# Patient Record
Sex: FEMALE | Race: WHITE | URBAN - METROPOLITAN AREA
[De-identification: names, ages, dates, MRNs, and addresses within clinical notes are randomized per-mention and may not be internally consistent; named-entity substitution may affect disease eponyms.]

---
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
## 2023-01-11 ENCOUNTER — VIRTUAL VISIT (OUTPATIENT)
Dept: BEHAVIORAL/MENTAL HEALTH CLINIC | Age: 18
End: 2023-01-11
Payer: COMMERCIAL

## 2023-01-11 DIAGNOSIS — F41.1 GAD (GENERALIZED ANXIETY DISORDER): ICD-10-CM

## 2023-01-11 DIAGNOSIS — F60.3 BORDERLINE PERSONALITY DISORDER (HCC): ICD-10-CM

## 2023-01-11 DIAGNOSIS — F33.1 MODERATE EPISODE OF RECURRENT MAJOR DEPRESSIVE DISORDER (HCC): Primary | ICD-10-CM

## 2023-01-11 DIAGNOSIS — F90.2 ADHD (ATTENTION DEFICIT HYPERACTIVITY DISORDER), COMBINED TYPE: ICD-10-CM

## 2023-01-11 PROCEDURE — 99214 OFFICE O/P EST MOD 30 MIN: CPT | Performed by: NURSE PRACTITIONER

## 2023-01-11 NOTE — PROGRESS NOTES
CHIEF COMPLAINT:  Mariajose Khoury is a 21 y.o. female and was seen today for follow-up of psychiatric condition and psychotropic medication management. HPI:    Frankie [facility] reports the following psychiatric symptoms by hx:  depression, anxiety and impaired concentration . Overall symptoms have been present for years. Currently symptom are of moderate severity. The symptoms occur intermittently. Pt reports medications are beneficial, symptoms are improved. Met with pt via telehealth for appt today to review current treatment plan. FAMILY/SOCIAL HX:   Psychosocial Stressors   Had a recent break up      REVIEW OF SYSTEMS:  Psychiatric symptoms being monitored for:  depression, ADHD, anxiety  Appetite:good   Sleep: improved   Neuro: denies  Cardio: denies   Patient positive for COVID     There were no vitals taken for this visit. Side Effects:  none    MENTAL STATUS EXAM:   Sensorium  oriented to time, place and person   Relations cooperative   Appearance:  age appropriate and within normal Limits   Motor Behavior:  within normal limits   Speech:  normal pitch and normal volume   Thought Process: within normal limits   Thought Content free of delusions and free of hallucinations   Suicidal ideations none   Homicidal ideations none   Mood:  within normal limits   Affect:  normal   Memory recent  adequate   Memory remote:  adequate   Concentration:  adequate   Abstraction:  abstract   Insight:  good   Reliability good   Judgment:  good     MEDICAL DECISION MAKING:  Problems addressed today:    ICD-10-CM ICD-9-CM    1. Moderate episode of recurrent major depressive disorder (HCC)  F33.1 296.32       2. ADHD (attention deficit hyperactivity disorder), combined type  F90.2 314.01       3. Borderline personality disorder ([facility] [address]  F60.3 301.83       4. NIK (generalized anxiety disorder)  F41.1 300.02           Assessment:   Frankie Smith is responding to treatment. Symptoms are stable overall.  She has some slight exacerbations of symptoms at the [facility], but she is doing better. 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 Seeing therapist Celi Appley and is doing well with her. Reinforced positive coping strategies. Discussed current medications and dosages. No changes made today. Reviewed treatment goals and target symptoms to monitor for. Plan:   1. Current Outpatient Medications   Medication Sig Dispense Refill    desvenlafaxine succinate (PRISTIQ) 25 mg ER tablet Take 1 Tablet by mouth daily. 30 Tablet 1    ARIPiprazole (ABILIFY) 2 mg tablet Take 1 Tablet by mouth daily. 30 Tablet 2    amphetamine-dextroamphetamine XR (ADDERALL XR) 15 mg XR capsule Take 1 Capsule by mouth every morning. Max Daily Amount: 15 mg. 90 Capsule 0    propranoloL (INDERAL) 10 mg tablet Take 1 Tablet by mouth two (2) times daily as needed for Anxiety. 60 Tablet 0          medication changes made today: Continue Pristiq 25 mg po daily, Continue Abilify 2 mg po daily, continue Adderall 15 mg XR po daily. 2.  Counseling and coordination of care including instructions for treatment, risks/benefits, risk factor reduction and patient/family education. She agrees with the plan. Patient instructed to call with any side effects, questions or issues. Albert Barroso, was evaluated through a synchronous (real-time) audio-video encounter. The patient (or guardian if applicable) is aware that this is a billable service, which includes applicable co-pays. This Virtual Visit was conducted with patient's (and/or legal guardian's) consent. The visit was conducted pursuant to the emergency declaration under the [facility], [address] [place] [address] authority and the [employer] General Act. Patient identification was verified, and a caregiver was present when appropriate.   The patient was located at: Home: [facility] [address] [place] [address] [address]  The provider was located at: Facility (Appt Department): [id number] [facility]       An electronic signature was used to authenticate this note.   -- Fadia Flores NP          1/11/2023  Fadia Flores NP

## 2023-02-13 DIAGNOSIS — F90.2 ADHD (ATTENTION DEFICIT HYPERACTIVITY DISORDER), COMBINED TYPE: ICD-10-CM

## 2023-02-13 NOTE — TELEPHONE ENCOUNTER
Requested Prescriptions     Pending Prescriptions Disp Refills    amphetamine-dextroamphetamine XR (ADDERALL XR) 15 mg XR capsule 90 Capsule 0     Sig: Take 1 Capsule by mouth every morning. Max Daily Amount: 15 mg.       Last visit: 1/11/2023   Next visit: 4/11/2023

## 2023-02-16 RX ORDER — DEXTROAMPHETAMINE SACCHARATE, AMPHETAMINE ASPARTATE MONOHYDRATE, DEXTROAMPHETAMINE SULFATE AND AMPHETAMINE SULFATE 3.75; 3.75; 3.75; 3.75 MG/1; MG/1; MG/1; MG/1
15 CAPSULE, EXTENDED RELEASE ORAL
Qty: 90 CAPSULE | Refills: 0 | OUTPATIENT
Start: 2023-02-16

## 2023-03-01 DIAGNOSIS — F90.2 ADHD (ATTENTION DEFICIT HYPERACTIVITY DISORDER), COMBINED TYPE: ICD-10-CM

## 2023-03-01 NOTE — TELEPHONE ENCOUNTER
Requested Prescriptions     Pending Prescriptions Disp Refills    amphetamine-dextroamphetamine XR (ADDERALL XR) 15 mg XR capsule 90 Capsule 0     Sig: Take 1 Capsule by mouth every morning. Max Daily Amount: 15 mg. Last visit: 1/11/2023   Next visit: 4/11/2023     Patient confirmed pharmacy has medication in stock.

## 2023-03-02 RX ORDER — DEXTROAMPHETAMINE SACCHARATE, AMPHETAMINE ASPARTATE MONOHYDRATE, DEXTROAMPHETAMINE SULFATE AND AMPHETAMINE SULFATE 3.75; 3.75; 3.75; 3.75 MG/1; MG/1; MG/1; MG/1
15 CAPSULE, EXTENDED RELEASE ORAL
Qty: 90 CAPSULE | Refills: 0 | Status: SHIPPED | OUTPATIENT
Start: 2023-03-02

## 2023-03-02 NOTE — TELEPHONE ENCOUNTER
Last visit: 1/11/2023   Next visit: 4/11/2023      Patient confirmed pharmacy has medication in stock.     :  11/18/2022 10/18/2022 1 Adderall Xr 15 Mg Capsule 30.00 30 [place] [id number] [place] ([id number]) 0  [place]  09/23/2022 09/23/2022 1 Adderall Xr 15 Mg Capsule 30.00 30 [place]

## 2023-03-10 ENCOUNTER — TELEPHONE (OUTPATIENT)
Dept: BEHAVIORAL/MENTAL HEALTH CLINIC | Age: 18
End: 2023-03-10

## 2023-03-10 NOTE — TELEPHONE ENCOUNTER
Spoke with  regarding prior auth denial for Adderall XR. Patient has to try and fail two formulary alternatives. Alternatives have been scanned into [employer]; they include amphetamine-dextroamphetamine (which patient has tried), Mydayis, and Vyvanse are some of the choices.  Will send message to provider and patient regarding denial.

## 2023-03-13 DIAGNOSIS — F33.1 MODERATE EPISODE OF RECURRENT MAJOR DEPRESSIVE DISORDER (HCC): ICD-10-CM

## 2023-03-13 NOTE — TELEPHONE ENCOUNTER
Patient states she is home for spring break and left her medication at school. Patient is requesting a prescription for a 7 day supply of medication. Requested Prescriptions     Pending Prescriptions Disp Refills    ARIPiprazole (ABILIFY) 2 mg tablet 30 Tablet 2     Sig: Take 1 Tablet by mouth daily. desvenlafaxine succinate (PRISTIQ) 25 mg ER tablet 30 Tablet 1     Sig: Take 1 Tablet by mouth daily.       Last visit: 1/11/2023   Next visit: 4/11/2023

## 2023-03-14 RX ORDER — DESVENLAFAXINE 25 MG/1
25 TABLET, EXTENDED RELEASE ORAL DAILY
Qty: 7 TABLET | Refills: 0 | Status: SHIPPED | OUTPATIENT
Start: 2023-03-14

## 2023-03-14 RX ORDER — ARIPIPRAZOLE 2 MG/1
2 TABLET ORAL DAILY
Qty: 7 TABLET | Refills: 0 | Status: SHIPPED | OUTPATIENT
Start: 2023-03-14 | End: 2023-03-30

## 2023-03-14 NOTE — TELEPHONE ENCOUNTER
Sending to provider for approval. Patient has appt in April. Patient is on spring break at home. Left medication at school. Requesting a 7 day supply. I am unable to change qty on script without sending med to pharmacy.

## 2023-03-27 DIAGNOSIS — F33.1 MODERATE EPISODE OF RECURRENT MAJOR DEPRESSIVE DISORDER (HCC): ICD-10-CM

## 2023-03-30 RX ORDER — ARIPIPRAZOLE 2 MG/1
TABLET ORAL
Qty: 30 TABLET | Refills: 0 | Status: SHIPPED | OUTPATIENT
Start: 2023-03-30

## 2023-04-29 RX ORDER — DESVENLAFAXINE 25 MG/1
TABLET, EXTENDED RELEASE ORAL DAILY
COMMUNITY
Start: 2022-12-05

## 2023-04-29 RX ORDER — PROPRANOLOL HYDROCHLORIDE 10 MG/1
TABLET ORAL 2 TIMES DAILY PRN
COMMUNITY
Start: 2022-05-25 | End: 2023-06-12

## 2023-04-29 RX ORDER — DEXTROAMPHETAMINE SACCHARATE, AMPHETAMINE ASPARTATE MONOHYDRATE, DEXTROAMPHETAMINE SULFATE AND AMPHETAMINE SULFATE 3.75; 3.75; 3.75; 3.75 MG/1; MG/1; MG/1; MG/1
CAPSULE, EXTENDED RELEASE ORAL
COMMUNITY
Start: 2023-03-02

## 2023-04-29 RX ORDER — ARIPIPRAZOLE 2 MG/1
TABLET ORAL DAILY
COMMUNITY
Start: 2022-11-18

## 2023-07-11 ENCOUNTER — OFFICE VISIT (OUTPATIENT)
Age: 18
End: 2023-07-11
Payer: COMMERCIAL

## 2023-07-11 VITALS — OXYGEN SATURATION: 94 % | SYSTOLIC BLOOD PRESSURE: 114 MMHG | DIASTOLIC BLOOD PRESSURE: 68 MMHG | HEART RATE: 81 BPM

## 2023-07-11 DIAGNOSIS — F33.1 MAJOR DEPRESSIVE DISORDER, RECURRENT, MODERATE (HCC): Primary | ICD-10-CM

## 2023-07-11 DIAGNOSIS — F90.2 ATTENTION-DEFICIT HYPERACTIVITY DISORDER, COMBINED TYPE: ICD-10-CM

## 2023-07-11 PROCEDURE — 99214 OFFICE O/P EST MOD 30 MIN: CPT | Performed by: NURSE PRACTITIONER

## 2023-07-11 RX ORDER — ARIPIPRAZOLE 2 MG/1
2 TABLET ORAL DAILY
Qty: 30 TABLET | Refills: 5 | Status: SHIPPED | OUTPATIENT
Start: 2023-07-11

## 2023-07-11 RX ORDER — DEXTROAMPHETAMINE SACCHARATE, AMPHETAMINE ASPARTATE MONOHYDRATE, DEXTROAMPHETAMINE SULFATE AND AMPHETAMINE SULFATE 3.75; 3.75; 3.75; 3.75 MG/1; MG/1; MG/1; MG/1
15 CAPSULE, EXTENDED RELEASE ORAL DAILY
Qty: 30 CAPSULE | Refills: 0 | Status: SHIPPED | OUTPATIENT
Start: 2023-07-11 | End: 2023-08-10

## 2023-07-11 RX ORDER — DEXTROAMPHETAMINE SACCHARATE, AMPHETAMINE ASPARTATE MONOHYDRATE, DEXTROAMPHETAMINE SULFATE AND AMPHETAMINE SULFATE 3.75; 3.75; 3.75; 3.75 MG/1; MG/1; MG/1; MG/1
15 CAPSULE, EXTENDED RELEASE ORAL DAILY
Qty: 30 CAPSULE | Refills: 0 | Status: SHIPPED | OUTPATIENT
Start: 2023-08-11 | End: 2023-09-10

## 2023-07-11 RX ORDER — DESVENLAFAXINE 25 MG/1
25 TABLET, EXTENDED RELEASE ORAL DAILY
Qty: 30 TABLET | Refills: 5 | Status: SHIPPED | OUTPATIENT
Start: 2023-07-11

## 2023-07-11 RX ORDER — DEXTROAMPHETAMINE SACCHARATE, AMPHETAMINE ASPARTATE MONOHYDRATE, DEXTROAMPHETAMINE SULFATE AND AMPHETAMINE SULFATE 3.75; 3.75; 3.75; 3.75 MG/1; MG/1; MG/1; MG/1
15 CAPSULE, EXTENDED RELEASE ORAL DAILY
Qty: 30 CAPSULE | Refills: 0 | Status: SHIPPED | OUTPATIENT
Start: 2023-09-11 | End: 2023-10-11

## 2023-07-11 ASSESSMENT — PATIENT HEALTH QUESTIONNAIRE - PHQ9
SUM OF ALL RESPONSES TO PHQ QUESTIONS 1-9: 5
SUM OF ALL RESPONSES TO PHQ9 QUESTIONS 1 & 2: 2
6. FEELING BAD ABOUT YOURSELF - OR THAT YOU ARE A FAILURE OR HAVE LET YOURSELF OR YOUR FAMILY DOWN: 1
10. IF YOU CHECKED OFF ANY PROBLEMS, HOW DIFFICULT HAVE THESE PROBLEMS MADE IT FOR YOU TO DO YOUR WORK, TAKE CARE OF THINGS AT HOME, OR GET ALONG WITH OTHER PEOPLE: 1
3. TROUBLE FALLING OR STAYING ASLEEP: 0
4. FEELING TIRED OR HAVING LITTLE ENERGY: 1
SUM OF ALL RESPONSES TO PHQ QUESTIONS 1-9: 5
5. POOR APPETITE OR OVEREATING: 0
7. TROUBLE CONCENTRATING ON THINGS, SUCH AS READING THE NEWSPAPER OR WATCHING TELEVISION: 1
9. THOUGHTS THAT YOU WOULD BE BETTER OFF DEAD, OR OF HURTING YOURSELF: 0
SUM OF ALL RESPONSES TO PHQ QUESTIONS 1-9: 5
2. FEELING DOWN, DEPRESSED OR HOPELESS: 1
8. MOVING OR SPEAKING SO SLOWLY THAT OTHER PEOPLE COULD HAVE NOTICED. OR THE OPPOSITE, BEING SO FIGETY OR RESTLESS THAT YOU HAVE BEEN MOVING AROUND A LOT MORE THAN USUAL: 0
1. LITTLE INTEREST OR PLEASURE IN DOING THINGS: 1
SUM OF ALL RESPONSES TO PHQ QUESTIONS 1-9: 5

## 2023-08-30 ENCOUNTER — TELEPHONE (OUTPATIENT)
Age: 18
End: 2023-08-30

## 2023-11-27 ENCOUNTER — TELEPHONE (OUTPATIENT)
Age: 18
End: 2023-11-27

## 2023-11-27 NOTE — TELEPHONE ENCOUNTER
Patient needs refill on following meds , she is new to provider with Sherine  upcoming appt  1/3/24        amphetamine-dextroamphetamine (ADDERALL XR) 15 MG extended release capsule

## 2024-01-03 ENCOUNTER — TELEPHONE (OUTPATIENT)
Age: 19
End: 2024-01-03

## 2024-01-03 ENCOUNTER — OFFICE VISIT (OUTPATIENT)
Age: 19
End: 2024-01-03
Payer: COMMERCIAL

## 2024-01-03 VITALS
HEART RATE: 69 BPM | RESPIRATION RATE: 16 BRPM | OXYGEN SATURATION: 100 % | DIASTOLIC BLOOD PRESSURE: 85 MMHG | HEIGHT: 67 IN | BODY MASS INDEX: 42.38 KG/M2 | SYSTOLIC BLOOD PRESSURE: 115 MMHG | TEMPERATURE: 98.2 F | WEIGHT: 270 LBS

## 2024-01-03 DIAGNOSIS — F90.2 ATTENTION-DEFICIT HYPERACTIVITY DISORDER, COMBINED TYPE: ICD-10-CM

## 2024-01-03 DIAGNOSIS — F33.1 MAJOR DEPRESSIVE DISORDER, RECURRENT, MODERATE (HCC): Primary | ICD-10-CM

## 2024-01-03 DIAGNOSIS — F33.1 MAJOR DEPRESSIVE DISORDER, RECURRENT, MODERATE (HCC): ICD-10-CM

## 2024-01-03 DIAGNOSIS — F41.1 GENERALIZED ANXIETY DISORDER: ICD-10-CM

## 2024-01-03 PROCEDURE — 90792 PSYCH DIAG EVAL W/MED SRVCS: CPT | Performed by: NURSE PRACTITIONER

## 2024-01-03 RX ORDER — LISDEXAMFETAMINE DIMESYLATE CAPSULES 40 MG/1
40 CAPSULE ORAL EVERY MORNING
Qty: 30 CAPSULE | Refills: 0 | Status: SHIPPED | OUTPATIENT
Start: 2024-01-03 | End: 2024-01-03

## 2024-01-03 RX ORDER — ARIPIPRAZOLE 2 MG/1
2 TABLET ORAL DAILY
Qty: 30 TABLET | Refills: 2 | Status: SHIPPED | OUTPATIENT
Start: 2024-01-03

## 2024-01-03 RX ORDER — PROPRANOLOL HYDROCHLORIDE 10 MG/1
TABLET ORAL
Qty: 60 TABLET | Refills: 1 | Status: SHIPPED | OUTPATIENT
Start: 2024-01-03

## 2024-01-03 RX ORDER — DESVENLAFAXINE 25 MG/1
25 TABLET, EXTENDED RELEASE ORAL DAILY
Qty: 30 TABLET | Refills: 2 | Status: SHIPPED | OUTPATIENT
Start: 2024-01-03

## 2024-01-03 RX ORDER — DEXTROAMPHETAMINE SACCHARATE, AMPHETAMINE ASPARTATE MONOHYDRATE, DEXTROAMPHETAMINE SULFATE AND AMPHETAMINE SULFATE 3.75; 3.75; 3.75; 3.75 MG/1; MG/1; MG/1; MG/1
15 CAPSULE, EXTENDED RELEASE ORAL DAILY
Qty: 30 CAPSULE | Refills: 0 | Status: SHIPPED | OUTPATIENT
Start: 2024-01-03 | End: 2024-02-02

## 2024-01-03 ASSESSMENT — PATIENT HEALTH QUESTIONNAIRE - PHQ9
6. FEELING BAD ABOUT YOURSELF - OR THAT YOU ARE A FAILURE OR HAVE LET YOURSELF OR YOUR FAMILY DOWN: 2
4. FEELING TIRED OR HAVING LITTLE ENERGY: 3
5. POOR APPETITE OR OVEREATING: 3
7. TROUBLE CONCENTRATING ON THINGS, SUCH AS READING THE NEWSPAPER OR WATCHING TELEVISION: 2
3. TROUBLE FALLING OR STAYING ASLEEP: 0
9. THOUGHTS THAT YOU WOULD BE BETTER OFF DEAD, OR OF HURTING YOURSELF: 1
2. FEELING DOWN, DEPRESSED OR HOPELESS: 1
8. MOVING OR SPEAKING SO SLOWLY THAT OTHER PEOPLE COULD HAVE NOTICED. OR THE OPPOSITE, BEING SO FIGETY OR RESTLESS THAT YOU HAVE BEEN MOVING AROUND A LOT MORE THAN USUAL: 1
SUM OF ALL RESPONSES TO PHQ9 QUESTIONS 1 & 2: 2
SUM OF ALL RESPONSES TO PHQ QUESTIONS 1-9: 14
SUM OF ALL RESPONSES TO PHQ QUESTIONS 1-9: 14
SUM OF ALL RESPONSES TO PHQ QUESTIONS 1-9: 13
1. LITTLE INTEREST OR PLEASURE IN DOING THINGS: 1
10. IF YOU CHECKED OFF ANY PROBLEMS, HOW DIFFICULT HAVE THESE PROBLEMS MADE IT FOR YOU TO DO YOUR WORK, TAKE CARE OF THINGS AT HOME, OR GET ALONG WITH OTHER PEOPLE: 1
SUM OF ALL RESPONSES TO PHQ QUESTIONS 1-9: 14

## 2024-01-03 ASSESSMENT — ANXIETY QUESTIONNAIRES
7. FEELING AFRAID AS IF SOMETHING AWFUL MIGHT HAPPEN: 2
4. TROUBLE RELAXING: 2
GAD7 TOTAL SCORE: 16
3. WORRYING TOO MUCH ABOUT DIFFERENT THINGS: 2
2. NOT BEING ABLE TO STOP OR CONTROL WORRYING: 2
6. BECOMING EASILY ANNOYED OR IRRITABLE: 3
1. FEELING NERVOUS, ANXIOUS, OR ON EDGE: 2
IF YOU CHECKED OFF ANY PROBLEMS ON THIS QUESTIONNAIRE, HOW DIFFICULT HAVE THESE PROBLEMS MADE IT FOR YOU TO DO YOUR WORK, TAKE CARE OF THINGS AT HOME, OR GET ALONG WITH OTHER PEOPLE: VERY DIFFICULT
5. BEING SO RESTLESS THAT IT IS HARD TO SIT STILL: 3

## 2024-01-03 NOTE — PROGRESS NOTES
Chief Complaint   Patient presents with    New Patient     Former Ragini pt     Vitals:    01/03/24 1108   BP: 115/85   Pulse: 69   Resp: 16   Temp: 98.2 °F (36.8 °C)   SpO2: 100%     Prior to Admission medications    Medication Sig Start Date End Date Taking? Authorizing Provider   ARIPiprazole (ABILIFY) 2 MG tablet Take 1 tablet by mouth daily 7/11/23  Yes Ragini Dixon APRN - NP   desvenlafaxine succinate (PRISTIQ) 25 MG TB24 extended release tablet Take 1 tablet by mouth daily 7/11/23  Yes Ragini Dixon APRN - NP   propranolol (INDERAL) 10 MG tablet TAKE 1 TABLET BY MOUTH TWICE DAILY AS NEEDED FOR ANXIETY 6/12/23  Yes Ragini Dixon APRN - NP   amphetamine-dextroamphetamine (ADDERALL XR) 15 MG extended release capsule Take by mouth. 3/2/23  Yes Automatic Reconciliation, Ar   amphetamine-dextroamphetamine (ADDERALL XR) 15 MG extended release capsule Take 1 capsule by mouth daily for 30 days. Max Daily Amount: 15 mg 7/11/23 8/10/23  Ragini Dixon APRN - NP   amphetamine-dextroamphetamine (ADDERALL XR) 15 MG extended release capsule Take 1 capsule by mouth daily for 30 days. Max Daily Amount: 15 mg 8/11/23 9/10/23  Ragini Dixon APRN - NP   amphetamine-dextroamphetamine (ADDERALL XR) 15 MG extended release capsule Take 1 capsule by mouth daily for 30 days. Max Daily Amount: 15 mg 9/11/23 10/11/23  Ragini Dixon APRN - NP     PHQ-9 Total Score: 14 (1/3/2024 11:06 AM)  Thoughts that you would be better off dead, or of hurting yourself in some way: 1 (1/3/2024 11:06 AM)        1/3/2024    11:08 AM   ABDON-7 SCREENING   Feeling nervous, anxious, or on edge More than half the days   Not being able to stop or control worrying More than half the days   Worrying too much about different things More than half the days   Trouble relaxing More than half the days   Being so restless that it is hard to sit still Nearly every day   Becoming easily annoyed or irritable Nearly every day   Feeling 
order labs for medication monitoring.     Addendum: Patient insurance not covering Vyvanse. Will resume Adderall Xr 15 mg daily. Staff instructed to call pharmacy to cancel Rx for Vyvanse.    Plan:   1. Medications:      Current Outpatient Medications   Medication Sig Dispense Refill    ARIPiprazole (ABILIFY) 2 MG tablet Take 1 tablet by mouth daily 30 tablet 2    desvenlafaxine succinate (PRISTIQ) 25 MG TB24 extended release tablet Take 1 tablet by mouth daily 30 tablet 2    propranolol (INDERAL) 10 MG tablet TAKE 1 TABLET BY MOUTH TWICE DAILY AS NEEDED FOR ANXIETY 60 tablet 1    Lisdexamfetamine Dimesylate 40 MG CAPS Take 40 mg by mouth every morning for 30 days. Max Daily Amount: 40 mg 30 capsule 0     No current facility-administered medications for this visit.      Adjust psychiatric medications as needed based upon diagnoses and response to treatment.      2. Counseling and coordination of care including instructions for treatment, risks/benefits, risk factor reduction and patient/family education. She agrees with the plan. Patient instructed to call with any side effects, questions or issues.  3. PSYCHOTHERAPY: Patient was provided with supportive therapy, strongly encourage to continue psychotherapy.   4. MEDICAL CARE: Patient was strongly encourage to take their medical medications and follow up with their PCP on regular basis.    5. SUBSTANCE ABUSE CARE: Discussed the need to abstain from use of alcohol and marijuana with medication use.  6. Review previous labs and medical tests in the EHR. I will continue to order blood tests/labs and diagnostic tests as deemed appropriate and review results as they become available (see orders for details).  7. Review old psychiatric and medical records available in the EHR. I will order additional psychiatric records from other institutions/providers as appropriate.  8. Gather additional collateral information as appropriate.  9.Patient was informed that in case of

## 2024-01-29 ENCOUNTER — TELEMEDICINE (OUTPATIENT)
Age: 19
End: 2024-01-29
Payer: COMMERCIAL

## 2024-01-29 DIAGNOSIS — F90.2 ATTENTION-DEFICIT HYPERACTIVITY DISORDER, COMBINED TYPE: ICD-10-CM

## 2024-01-29 DIAGNOSIS — F33.1 MAJOR DEPRESSIVE DISORDER, RECURRENT, MODERATE (HCC): ICD-10-CM

## 2024-01-29 DIAGNOSIS — F41.1 GENERALIZED ANXIETY DISORDER: ICD-10-CM

## 2024-01-29 PROCEDURE — 99214 OFFICE O/P EST MOD 30 MIN: CPT | Performed by: NURSE PRACTITIONER

## 2024-01-29 RX ORDER — ARIPIPRAZOLE 2 MG/1
2 TABLET ORAL DAILY
Qty: 30 TABLET | Refills: 2 | Status: SHIPPED | OUTPATIENT
Start: 2024-01-29

## 2024-01-29 RX ORDER — PROPRANOLOL HYDROCHLORIDE 10 MG/1
TABLET ORAL
Qty: 60 TABLET | Refills: 1 | Status: SHIPPED | OUTPATIENT
Start: 2024-01-29

## 2024-01-29 RX ORDER — DESVENLAFAXINE 25 MG/1
25 TABLET, EXTENDED RELEASE ORAL DAILY
Qty: 30 TABLET | Refills: 2 | Status: SHIPPED | OUTPATIENT
Start: 2024-01-29

## 2024-01-29 RX ORDER — DEXTROAMPHETAMINE SACCHARATE, AMPHETAMINE ASPARTATE MONOHYDRATE, DEXTROAMPHETAMINE SULFATE AND AMPHETAMINE SULFATE 3.75; 3.75; 3.75; 3.75 MG/1; MG/1; MG/1; MG/1
15 CAPSULE, EXTENDED RELEASE ORAL DAILY
Qty: 30 CAPSULE | Refills: 0 | Status: SHIPPED | OUTPATIENT
Start: 2024-01-29 | End: 2024-02-28

## 2024-01-29 NOTE — PROGRESS NOTES
Porsha Montemayor, was evaluated through a synchronous (real-time) audio-video encounter. The patient (or guardian if applicable) is aware that this is a billable service, which includes applicable co-pays. This Virtual Visit was conducted with patient's (and/or legal guardian's) consent. Patient identification was verified, and a caregiver was present when appropriate.     The patient was located at Home: [address]  [place] [facility] [address]  Provider was located at Facility (Appt Dept): [address] [facility]  [address]  [place] [address]    {STOP! Confirm you are appropriately licensed, registered, or certified to deliver care in the state where the patient is located as indicated above. If you are not or unsure, please re-schedule the visit.     Are you appropriately licensed in the patient's state?: Yes    CHIEF COMPLAINT:  Porsha Montemayor is a 18 y.o. female and was seen today for follow-up of psychiatric condition and psychotropic medication management.     HPI:    Porsha reports the following psychiatric symptoms by hx:  depression, anxiety, and inattentiveness.  Overall symptoms have been present for years. Currently symptoms are of moderate severity. The symptoms occur less frequently and are less severe. Patient reports work and school stress. She feels her anxiety has been up a little. She rates her anxiety at 6/10 with 10 being the worst. Depression is doing \"better.\" She rates her depression at 3-4/10 with 10 being the worst. Energy level is fair. Concentrate and ability to stay on task is better with being back on Adderall. She reports compliance with medications. Denies side effects. No complaint of chest pain, dizziness, palpitations, headaches. Patient Appetite:no change from normal. Sleep: good. Patient denies symptoms of psychosis or zane. Denies suicidal or homicidal ideation.    No current outpatient medications on file prior to visit.     No current

## 2024-02-13 ENCOUNTER — TELEPHONE (OUTPATIENT)
Age: 19
End: 2024-02-13

## 2024-02-13 NOTE — TELEPHONE ENCOUNTER
Simran from [facility] [place] [place] called and would like a call back regarding this patients drug screen that is needed no other info was given